# Patient Record
Sex: MALE | URBAN - METROPOLITAN AREA
[De-identification: names, ages, dates, MRNs, and addresses within clinical notes are randomized per-mention and may not be internally consistent; named-entity substitution may affect disease eponyms.]

---

## 2023-02-14 ENCOUNTER — APPOINTMENT (OUTPATIENT)
Dept: URBAN - METROPOLITAN AREA CLINIC 268 | Age: 33
Setting detail: DERMATOLOGY
End: 2023-02-14

## 2023-02-14 VITALS — HEIGHT: 74 IN | WEIGHT: 165 LBS

## 2023-02-14 DIAGNOSIS — L85.8 OTHER SPECIFIED EPIDERMAL THICKENING: ICD-10-CM

## 2023-02-14 DIAGNOSIS — T81.89 OTHER COMPLICATIONS OF PROCEDURES, NOT ELSEWHERE CLASSIFIED: ICD-10-CM

## 2023-02-14 DIAGNOSIS — L90.5 SCAR CONDITIONS AND FIBROSIS OF SKIN: ICD-10-CM

## 2023-02-14 PROBLEM — T81.89XA OTHER COMPLICATIONS OF PROCEDURES, NOT ELSEWHERE CLASSIFIED, INITIAL ENCOUNTER: Status: ACTIVE | Noted: 2023-02-14

## 2023-02-14 PROCEDURE — OTHER REASSURANCE: OTHER

## 2023-02-14 PROCEDURE — OTHER ADDITIONAL NOTES: OTHER

## 2023-02-14 PROCEDURE — OTHER MIPS QUALITY: OTHER

## 2023-02-14 PROCEDURE — OTHER COUNSELING: OTHER

## 2023-02-14 PROCEDURE — OTHER PRESCRIPTION MEDICATION MANAGEMENT: OTHER

## 2023-02-14 PROCEDURE — OTHER PRESCRIPTION: OTHER

## 2023-02-14 PROCEDURE — 99203 OFFICE O/P NEW LOW 30 MIN: CPT

## 2023-02-14 RX ORDER — DOXYCYCLINE HYCLATE 100 MG/1
CAPSULE, GELATIN COATED ORAL
Qty: 42 | Refills: 0 | Status: ERX | COMMUNITY
Start: 2023-02-14

## 2023-02-14 RX ORDER — UREA 400 MG/G
CREAM TOPICAL
Qty: 85 | Refills: 3 | Status: ERX | COMMUNITY
Start: 2023-02-14

## 2023-02-14 RX ORDER — MUPIROCIN 20 MG/G
OINTMENT TOPICAL
Qty: 15 | Refills: 1 | Status: ERX | COMMUNITY
Start: 2023-02-14

## 2023-02-14 ASSESSMENT — LOCATION DETAILED DESCRIPTION DERM
LOCATION DETAILED: RIGHT DISTAL PRETIBIAL REGION
LOCATION DETAILED: LEFT PROXIMAL PRETIBIAL REGION
LOCATION DETAILED: LEFT ELBOW
LOCATION DETAILED: RIGHT DISTAL POSTERIOR UPPER ARM
LOCATION DETAILED: RIGHT PROXIMAL PRETIBIAL REGION
LOCATION DETAILED: LEFT VENTRAL DISTAL FOREARM
LOCATION DETAILED: RIGHT PROXIMAL DORSAL FOREARM
LOCATION DETAILED: RIGHT VENTRAL PROXIMAL FOREARM
LOCATION DETAILED: LEFT PROXIMAL DORSAL FOREARM

## 2023-02-14 ASSESSMENT — LOCATION SIMPLE DESCRIPTION DERM
LOCATION SIMPLE: LEFT ELBOW
LOCATION SIMPLE: LEFT PRETIBIAL REGION
LOCATION SIMPLE: RIGHT UPPER ARM
LOCATION SIMPLE: RIGHT PRETIBIAL REGION
LOCATION SIMPLE: RIGHT FOREARM
LOCATION SIMPLE: LEFT FOREARM

## 2023-02-14 ASSESSMENT — LOCATION ZONE DERM
LOCATION ZONE: ARM
LOCATION ZONE: LEG

## 2023-02-14 NOTE — PROCEDURE: ADDITIONAL NOTES
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
Render Risk Assessment In Note?: no
Detail Level: Simple

## 2023-02-14 NOTE — HPI: DISCOLORATION (HYPERPIGMENTATION)
Is This A New Presentation, Or A Follow-Up?: Discoloration
How Severe Is It?: moderate
Additional History: He moved in [facility] and started to have these symptoms. He stated that everyone that lives there ends up with bugs that turns into scabies. 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 He feels that whatever is in [facility] is causing severe issues to anyone who lives there even will end up in a wheelchair.

## 2023-02-14 NOTE — PROCEDURE: MIPS QUALITY
Quality 110: Preventive Care And Screening: Influenza Immunization: Influenza Immunization not Administered for Documented Reasons.
Quality 431: Preventive Care And Screening: Unhealthy Alcohol Use - Screening: Patient not identified as an unhealthy alcohol user when screened for unhealthy alcohol use using a systematic screening method
Quality 226: Preventive Care And Screening: Tobacco Use: Screening And Cessation Intervention: Patient screened for tobacco use, is a smoker AND received Cessation Counseling within the Previous 12 Months
Detail Level: Detailed

## 2023-02-14 NOTE — PROCEDURE: PRESCRIPTION MEDICATION MANAGEMENT
Initiate Treatment: urea 40 % topical cream \\nQuantity: 85.0 g  Days Supply: 30\\nSig: Apply once a day to hands
Detail Level: Zone
Continue Regimen: Triamcinolone no refills needed
Render In Strict Bullet Format?: No
Initiate Treatment: doxycycline hyclate 100 mg capsule \\nQuantity: 42.0 Capsule  Days Supply: 21\\nSig: Take 1 po BID with food\\n\\nmupirocin 2 % topical ointment \\nQuantity: 15.0 g  Days Supply: 30\\nSig: Apply to wound